# Patient Record
Sex: MALE | HISPANIC OR LATINO | URBAN - METROPOLITAN AREA
[De-identification: names, ages, dates, MRNs, and addresses within clinical notes are randomized per-mention and may not be internally consistent; named-entity substitution may affect disease eponyms.]

---

## 2021-04-08 ENCOUNTER — OFFICE VISIT (OUTPATIENT)
Dept: URBAN - METROPOLITAN AREA CLINIC 54 | Facility: CLINIC | Age: 36
End: 2021-04-08

## 2022-12-05 ENCOUNTER — OFFICE VISIT (OUTPATIENT)
Dept: URBAN - METROPOLITAN AREA CLINIC 54 | Facility: CLINIC | Age: 37
End: 2022-12-05

## 2023-02-13 ENCOUNTER — OFFICE VISIT (OUTPATIENT)
Dept: URBAN - METROPOLITAN AREA CLINIC 54 | Facility: CLINIC | Age: 38
End: 2023-02-13
Payer: COMMERCIAL

## 2023-02-13 VITALS
HEIGHT: 68 IN | WEIGHT: 234 LBS | TEMPERATURE: 98.2 F | BODY MASS INDEX: 35.46 KG/M2 | HEART RATE: 90 BPM | SYSTOLIC BLOOD PRESSURE: 124 MMHG | DIASTOLIC BLOOD PRESSURE: 84 MMHG

## 2023-02-13 DIAGNOSIS — K21.9 GERD WITHOUT ESOPHAGITIS: ICD-10-CM

## 2023-02-13 DIAGNOSIS — R14.0 BLOATING: ICD-10-CM

## 2023-02-13 PROCEDURE — 99204 OFFICE O/P NEW MOD 45 MIN: CPT | Performed by: INTERNAL MEDICINE

## 2023-02-13 RX ORDER — LORAZEPAM 0.5 MG/1
1 TABLET AT BEDTIME AS NEEDED TABLET ORAL ONCE A DAY
Status: ACTIVE | COMMUNITY

## 2023-02-13 NOTE — HPI-TODAY'S VISIT:
Patient reports that he has has stomach pain and bloating x 1 year.  Had upper endoscopy which thought to have an allergy. Had a lot of inflammation. Placed on gluten free diet and placed on dexilant. Pt was referred for biliary dyskinesia and then he felt slightly better the first week. Had lost 10-15#.  Pt reports that he regained the weight.  Pt reports that the symptoms did not stop.  Pt reports that the dexilant was not working any further. Pt was found to have delayed gastric emptying as well.  Pt reports that he was advised to continue with the medications.  Pt reports that he was recommended to take the omeprazole and wean off the dexilant.  Pt reports that the bloating bothers him the most. The reflux is persistent that radiates to the back.  Pt reports that he has pain to neck and has a panic attack.

## 2023-02-15 ENCOUNTER — OFFICE VISIT (OUTPATIENT)
Dept: URBAN - METROPOLITAN AREA SURGERY CENTER 14 | Facility: SURGERY CENTER | Age: 38
End: 2023-02-15

## 2023-02-16 PROBLEM — 266435005: Status: ACTIVE | Noted: 2023-02-13

## 2023-02-24 ENCOUNTER — OFFICE VISIT (OUTPATIENT)
Dept: URBAN - METROPOLITAN AREA SURGERY CENTER 14 | Facility: SURGERY CENTER | Age: 38
End: 2023-02-24

## 2023-02-28 ENCOUNTER — CLAIMS CREATED FROM THE CLAIM WINDOW (OUTPATIENT)
Dept: URBAN - METROPOLITAN AREA CLINIC 4 | Facility: CLINIC | Age: 38
End: 2023-02-28
Payer: COMMERCIAL

## 2023-02-28 ENCOUNTER — OFFICE VISIT (OUTPATIENT)
Dept: URBAN - METROPOLITAN AREA SURGERY CENTER 14 | Facility: SURGERY CENTER | Age: 38
End: 2023-02-28
Payer: COMMERCIAL

## 2023-02-28 DIAGNOSIS — K31.89 ACQUIRED DEFORMITY OF DUODENUM: ICD-10-CM

## 2023-02-28 DIAGNOSIS — R10.13 ABDOMINAL DISCOMFORT, EPIGASTRIC: ICD-10-CM

## 2023-02-28 DIAGNOSIS — K31.89 OTHER DISEASES OF STOMACH AND DUODENUM: ICD-10-CM

## 2023-02-28 DIAGNOSIS — K31.7 POLYP OF STOMACH AND DUODENUM: ICD-10-CM

## 2023-02-28 DIAGNOSIS — K31.7 BENIGN GASTRIC POLYP: ICD-10-CM

## 2023-02-28 DIAGNOSIS — K21.9 ACID REFLUX: ICD-10-CM

## 2023-02-28 PROCEDURE — 88312 SPECIAL STAINS GROUP 1: CPT | Performed by: PATHOLOGY

## 2023-02-28 PROCEDURE — 88305 TISSUE EXAM BY PATHOLOGIST: CPT | Performed by: PATHOLOGY

## 2023-02-28 PROCEDURE — G8907 PT DOC NO EVENTS ON DISCHARG: HCPCS | Performed by: INTERNAL MEDICINE

## 2023-02-28 PROCEDURE — 43239 EGD BIOPSY SINGLE/MULTIPLE: CPT | Performed by: INTERNAL MEDICINE

## 2023-03-01 ENCOUNTER — TELEPHONE ENCOUNTER (OUTPATIENT)
Dept: URBAN - METROPOLITAN AREA CLINIC 54 | Facility: CLINIC | Age: 38
End: 2023-03-01

## 2023-03-01 RX ORDER — HYOSCYAMINE SULFATE 0.12 MG/1
1 TABLET UNDER THE TONGUE AND ALLOW TO DISSOLVE  AS NEEDED TABLET SUBLINGUAL THREE TIMES A DAY
Qty: 60 EACH | Refills: 1 | OUTPATIENT

## 2023-03-03 ENCOUNTER — TELEPHONE ENCOUNTER (OUTPATIENT)
Dept: URBAN - METROPOLITAN AREA CLINIC 54 | Facility: CLINIC | Age: 38
End: 2023-03-03

## 2023-03-06 ENCOUNTER — TELEPHONE ENCOUNTER (OUTPATIENT)
Dept: URBAN - NONMETROPOLITAN AREA CLINIC 4 | Facility: CLINIC | Age: 38
End: 2023-03-06

## 2023-03-06 RX ORDER — PANCRELIPASE 36000; 180000; 114000 [USP'U]/1; [USP'U]/1; [USP'U]/1
AS DIRECTED CAPSULE, DELAYED RELEASE PELLETS ORAL
Qty: 250 | Refills: 3 | OUTPATIENT

## 2023-03-27 ENCOUNTER — TELEPHONE ENCOUNTER (OUTPATIENT)
Dept: URBAN - METROPOLITAN AREA CLINIC 54 | Facility: CLINIC | Age: 38
End: 2023-03-27

## 2023-03-28 ENCOUNTER — TELEPHONE ENCOUNTER (OUTPATIENT)
Dept: URBAN - METROPOLITAN AREA CLINIC 54 | Facility: CLINIC | Age: 38
End: 2023-03-28

## 2023-03-28 RX ORDER — AMITRIPTYLINE HYDROCHLORIDE 10 MG/1
1 TABLET AT BEDTIME TABLET, FILM COATED ORAL ONCE A DAY
Qty: 30 | Refills: 1 | OUTPATIENT
Start: 2023-04-06

## 2023-05-25 ENCOUNTER — TELEPHONE ENCOUNTER (OUTPATIENT)
Dept: URBAN - METROPOLITAN AREA CLINIC 54 | Facility: CLINIC | Age: 38
End: 2023-05-25

## 2023-05-30 ENCOUNTER — LAB OUTSIDE AN ENCOUNTER (OUTPATIENT)
Dept: URBAN - METROPOLITAN AREA CLINIC 54 | Facility: CLINIC | Age: 38
End: 2023-05-30

## 2023-06-05 ENCOUNTER — CLAIMS CREATED FROM THE CLAIM WINDOW (OUTPATIENT)
Dept: URBAN - NONMETROPOLITAN AREA CLINIC 4 | Facility: CLINIC | Age: 38
End: 2023-06-05

## 2023-06-05 ENCOUNTER — OFFICE VISIT (OUTPATIENT)
Dept: URBAN - NONMETROPOLITAN AREA CLINIC 4 | Facility: CLINIC | Age: 38
End: 2023-06-05

## 2023-06-05 ENCOUNTER — CLAIMS CREATED FROM THE CLAIM WINDOW (OUTPATIENT)
Dept: URBAN - NONMETROPOLITAN AREA CLINIC 4 | Facility: CLINIC | Age: 38
End: 2023-06-05
Payer: COMMERCIAL

## 2023-06-05 ENCOUNTER — WEB ENCOUNTER (OUTPATIENT)
Dept: URBAN - NONMETROPOLITAN AREA CLINIC 4 | Facility: CLINIC | Age: 38
End: 2023-06-05

## 2023-06-05 VITALS
BODY MASS INDEX: 35.49 KG/M2 | HEIGHT: 68 IN | SYSTOLIC BLOOD PRESSURE: 122 MMHG | TEMPERATURE: 97.3 F | HEART RATE: 76 BPM | DIASTOLIC BLOOD PRESSURE: 80 MMHG | WEIGHT: 234.2 LBS

## 2023-06-05 DIAGNOSIS — R14.0 BLOATING: ICD-10-CM

## 2023-06-05 DIAGNOSIS — K21.9 GERD WITHOUT ESOPHAGITIS: ICD-10-CM

## 2023-06-05 PROCEDURE — 99214 OFFICE O/P EST MOD 30 MIN: CPT | Performed by: INTERNAL MEDICINE

## 2023-06-05 RX ORDER — IBUPROFEN 800 MG/1
1 TABLET WITH FOOD OR MILK AS NEEDED TABLET, FILM COATED ORAL
Status: ON HOLD | COMMUNITY

## 2023-06-05 RX ORDER — HYOSCYAMINE SULFATE 0.12 MG/1
1 TABLET UNDER THE TONGUE AND ALLOW TO DISSOLVE  AS NEEDED TABLET SUBLINGUAL THREE TIMES A DAY
Qty: 60 EACH | Refills: 1 | Status: ACTIVE | COMMUNITY

## 2023-06-05 RX ORDER — LORAZEPAM 0.5 MG/1
1 TABLET AT BEDTIME AS NEEDED TABLET ORAL ONCE A DAY
Status: ACTIVE | COMMUNITY

## 2023-06-05 RX ORDER — AMITRIPTYLINE HYDROCHLORIDE 10 MG/1
1 TABLET AT BEDTIME TABLET, FILM COATED ORAL ONCE A DAY
Qty: 30 | Refills: 1 | Status: ACTIVE | COMMUNITY
Start: 2023-04-06

## 2023-06-05 RX ORDER — PANCRELIPASE 36000; 180000; 114000 [USP'U]/1; [USP'U]/1; [USP'U]/1
AS DIRECTED CAPSULE, DELAYED RELEASE PELLETS ORAL
Qty: 250 | Refills: 3 | Status: DISCONTINUED | COMMUNITY

## 2023-06-05 NOTE — HPI-TODAY'S VISIT:
Patient reports that he has has stomach pain and bloating x 1 year.  Had upper endoscopy which thought to have an allergy. Had a lot of inflammation. Placed on gluten free diet and placed on dexilant. Pt was referred for biliary dyskinesia and then he felt slightly better the first week. Had lost 10-15#.  Pt reports that he regained the weight.  Pt reports that the symptoms did not stop.  Pt reports that the dexilant was not working any further. Pt was found to have delayed gastric emptying as well.  Pt reports that he was advised to continue with the medications.  Pt reports that he was recommended to take the omeprazole and wean off the dexilant.  Pt reports that the bloating bothers him the most. The reflux is persistent that radiates to the back.  Pt reports that he has pain to neck and has a panic attack.  6-5-23 Pt reports that he had an ER visit in March along with CT which was negative.  Pt reports that symptoms are worse with eating.  Pt reports that he was advised to lose weight and had motiligy studies and gerd.  Pt reports that he was advised to see weight loss surgery.  Pt reports that every day he feels like bloated.  Pt reports that he feels something in the left upper and throught back.  Pt reports that he feels something still. Pt reports that he has not tried motility.   Of note review of records from 2022 with  normal gastric emptying Still w distension and bloating creon

## 2023-06-08 ENCOUNTER — OFFICE VISIT (OUTPATIENT)
Dept: URBAN - METROPOLITAN AREA TELEHEALTH 2 | Facility: TELEHEALTH | Age: 38
End: 2023-06-08
Payer: COMMERCIAL

## 2023-06-08 DIAGNOSIS — K21.9 GERD WITHOUT ESOPHAGITIS: ICD-10-CM

## 2023-06-08 DIAGNOSIS — K58.8 OTHER IRRITABLE BOWEL SYNDROME: ICD-10-CM

## 2023-06-08 PROCEDURE — 97802 MEDICAL NUTRITION INDIV IN: CPT | Performed by: DIETITIAN, REGISTERED

## 2023-06-08 RX ORDER — LORAZEPAM 0.5 MG/1
1 TABLET AT BEDTIME AS NEEDED TABLET ORAL ONCE A DAY
Status: ACTIVE | COMMUNITY

## 2023-06-08 RX ORDER — IBUPROFEN 800 MG/1
1 TABLET WITH FOOD OR MILK AS NEEDED TABLET, FILM COATED ORAL
Status: ON HOLD | COMMUNITY

## 2023-06-08 RX ORDER — AMITRIPTYLINE HYDROCHLORIDE 10 MG/1
1 TABLET AT BEDTIME TABLET, FILM COATED ORAL ONCE A DAY
Qty: 30 | Refills: 1 | Status: ACTIVE | COMMUNITY
Start: 2023-04-06

## 2023-06-08 RX ORDER — HYOSCYAMINE SULFATE 0.12 MG/1
1 TABLET UNDER THE TONGUE AND ALLOW TO DISSOLVE  AS NEEDED TABLET SUBLINGUAL THREE TIMES A DAY
Qty: 60 EACH | Refills: 1 | Status: ACTIVE | COMMUNITY

## 2023-06-09 ENCOUNTER — OFFICE VISIT (OUTPATIENT)
Dept: URBAN - METROPOLITAN AREA TELEHEALTH 2 | Facility: TELEHEALTH | Age: 38
End: 2023-06-09

## 2023-06-09 RX ORDER — IBUPROFEN 800 MG/1
1 TABLET WITH FOOD OR MILK AS NEEDED TABLET, FILM COATED ORAL
Status: ON HOLD | COMMUNITY

## 2023-06-09 RX ORDER — AMITRIPTYLINE HYDROCHLORIDE 10 MG/1
1 TABLET AT BEDTIME TABLET, FILM COATED ORAL ONCE A DAY
Qty: 30 | Refills: 1 | Status: ACTIVE | COMMUNITY
Start: 2023-04-06

## 2023-06-09 RX ORDER — LORAZEPAM 0.5 MG/1
1 TABLET AT BEDTIME AS NEEDED TABLET ORAL ONCE A DAY
Status: ACTIVE | COMMUNITY

## 2023-06-09 RX ORDER — HYOSCYAMINE SULFATE 0.12 MG/1
1 TABLET UNDER THE TONGUE AND ALLOW TO DISSOLVE  AS NEEDED TABLET SUBLINGUAL THREE TIMES A DAY
Qty: 60 EACH | Refills: 1 | Status: ACTIVE | COMMUNITY

## 2023-06-11 ENCOUNTER — WEB ENCOUNTER (OUTPATIENT)
Dept: URBAN - METROPOLITAN AREA CLINIC 54 | Facility: CLINIC | Age: 38
End: 2023-06-11

## 2023-07-18 ENCOUNTER — OFFICE VISIT (OUTPATIENT)
Dept: URBAN - METROPOLITAN AREA TELEHEALTH 2 | Facility: TELEHEALTH | Age: 38
End: 2023-07-18

## 2023-07-31 ENCOUNTER — TELEPHONE ENCOUNTER (OUTPATIENT)
Dept: URBAN - NONMETROPOLITAN AREA CLINIC 4 | Facility: CLINIC | Age: 38
End: 2023-07-31

## 2023-08-14 ENCOUNTER — TELEPHONE ENCOUNTER (OUTPATIENT)
Dept: URBAN - NONMETROPOLITAN AREA CLINIC 4 | Facility: CLINIC | Age: 38
End: 2023-08-14

## 2023-08-16 ENCOUNTER — WEB ENCOUNTER (OUTPATIENT)
Dept: URBAN - METROPOLITAN AREA CLINIC 54 | Facility: CLINIC | Age: 38
End: 2023-08-16

## 2023-08-16 RX ORDER — TENAPANOR HYDROCHLORIDE 53.2 MG/1
1 TABLET IMMEDIATELY BEFORE MEALS TABLET ORAL TWICE A DAY
Qty: 60 | OUTPATIENT
Start: 2023-08-18 | End: 2023-09-17

## 2023-08-18 ENCOUNTER — TELEPHONE ENCOUNTER (OUTPATIENT)
Dept: URBAN - METROPOLITAN AREA CLINIC 54 | Facility: CLINIC | Age: 38
End: 2023-08-18

## 2023-08-18 RX ORDER — TENAPANOR HYDROCHLORIDE 53.2 MG/1
1 TABLET IMMEDIATELY BEFORE MEALS TABLET ORAL TWICE A DAY
Qty: 60 | OUTPATIENT
Start: 2023-08-18 | End: 2023-09-17

## 2023-08-21 ENCOUNTER — TELEPHONE ENCOUNTER (OUTPATIENT)
Dept: URBAN - METROPOLITAN AREA CLINIC 54 | Facility: CLINIC | Age: 38
End: 2023-08-21

## 2023-08-22 ENCOUNTER — P2P PATIENT RECORD (OUTPATIENT)
Age: 38
End: 2023-08-22

## 2023-08-25 ENCOUNTER — TELEPHONE ENCOUNTER (OUTPATIENT)
Dept: URBAN - METROPOLITAN AREA CLINIC 54 | Facility: CLINIC | Age: 38
End: 2023-08-25

## 2023-08-29 ENCOUNTER — OFFICE VISIT (OUTPATIENT)
Dept: URBAN - METROPOLITAN AREA CLINIC 54 | Facility: CLINIC | Age: 38
End: 2023-08-29
Payer: COMMERCIAL

## 2023-08-29 ENCOUNTER — WEB ENCOUNTER (OUTPATIENT)
Dept: URBAN - METROPOLITAN AREA CLINIC 54 | Facility: CLINIC | Age: 38
End: 2023-08-29

## 2023-08-29 VITALS
TEMPERATURE: 97.6 F | WEIGHT: 232.2 LBS | HEIGHT: 68 IN | BODY MASS INDEX: 35.19 KG/M2 | SYSTOLIC BLOOD PRESSURE: 107 MMHG | DIASTOLIC BLOOD PRESSURE: 67 MMHG | HEART RATE: 72 BPM

## 2023-08-29 DIAGNOSIS — K21.9 GERD WITHOUT ESOPHAGITIS: ICD-10-CM

## 2023-08-29 DIAGNOSIS — R14.0 BLOATING: ICD-10-CM

## 2023-08-29 PROCEDURE — 99215 OFFICE O/P EST HI 40 MIN: CPT | Performed by: PHYSICIAN ASSISTANT

## 2023-08-29 RX ORDER — TENAPANOR HYDROCHLORIDE 53.2 MG/1
1 TABLET IMMEDIATELY BEFORE MEALS TABLET ORAL TWICE A DAY
Qty: 60 | Status: ACTIVE | COMMUNITY
Start: 2023-08-18 | End: 2023-09-17

## 2023-08-29 RX ORDER — DEXLANSOPRAZOLE 30 MG/1
1 CAPSULE CAPSULE, DELAYED RELEASE ORAL ONCE A DAY
Status: ACTIVE | COMMUNITY

## 2023-08-29 RX ORDER — IBUPROFEN 800 MG/1
1 TABLET WITH FOOD OR MILK AS NEEDED TABLET, FILM COATED ORAL
Status: ON HOLD | COMMUNITY

## 2023-08-29 RX ORDER — LORAZEPAM 0.5 MG/1
1 TABLET AT BEDTIME AS NEEDED TABLET ORAL ONCE A DAY
Status: ACTIVE | COMMUNITY

## 2023-08-29 RX ORDER — AMITRIPTYLINE HYDROCHLORIDE 10 MG/1
1 TABLET AT BEDTIME TABLET, FILM COATED ORAL ONCE A DAY
Qty: 30 | Refills: 1 | Status: ON HOLD | COMMUNITY
Start: 2023-04-06

## 2023-08-29 RX ORDER — LOSARTAN POTASSIUM 50 MG/1
1 TABLET TABLET ORAL ONCE A DAY
Status: ACTIVE | COMMUNITY

## 2023-08-29 NOTE — HPI-TODAY'S VISIT:
Patient reports that he has has stomach pain and bloating x 1 year.  Had upper endoscopy which thought to have an allergy. Had a lot of inflammation. Placed on gluten free diet and placed on dexilant. Pt was referred for biliary dyskinesia and then he felt slightly better the first week. Had lost 10-15#.  Pt reports that he regained the weight.  Pt reports that the symptoms did not stop.  Pt reports that the dexilant was not working any further. Pt was found to have delayed gastric emptying as well.  Pt reports that he was advised to continue with the medications.  Pt reports that he was recommended to take the omeprazole and wean off the dexilant.  Pt reports that the bloating bothers him the most. The reflux is persistent that radiates to the back.  Pt reports that he has pain to neck and has a panic attack.  6-5-23 Pt reports that he had an ER visit in March along with CT which was negative.  Pt reports that symptoms are worse with eating.  Pt reports that he was advised to lose weight and had motiligy studies and gerd.  Pt reports that he was advised to see weight loss surgery.  Pt reports that every day he feels like bloated.  Pt reports that he feels something in the left upper and throught back.  Pt reports that he feels something still. Pt reports that he has not tried motility.   Of note review of records from 2022 with  normal gastric emptying Still w distension and bloating creon  8/29/23 Follow up: Normal previous GES. Missed appointment for repeat GES. Negative SIBO. Incomplete lactulose testing. Patient trialed Ibsrela with improvement in symptoms. Denied coverage by insurance. Specialty pharmacy plans to reach out to patient about PAP. Recently diagnosed with hypertriglyceridemia and started on fenofibrate. Denies EtOH and tobacco use. On Creon. Still with bloating, pain, and constipation.

## 2023-08-30 ENCOUNTER — OFFICE VISIT (OUTPATIENT)
Dept: URBAN - NONMETROPOLITAN AREA CLINIC 4 | Facility: CLINIC | Age: 38
End: 2023-08-30

## 2023-09-25 ENCOUNTER — OFFICE VISIT (OUTPATIENT)
Dept: URBAN - NONMETROPOLITAN AREA CLINIC 4 | Facility: CLINIC | Age: 38
End: 2023-09-25
Payer: COMMERCIAL

## 2023-09-25 VITALS
WEIGHT: 231.4 LBS | BODY MASS INDEX: 35.07 KG/M2 | HEIGHT: 68 IN | SYSTOLIC BLOOD PRESSURE: 119 MMHG | HEART RATE: 87 BPM | TEMPERATURE: 97.5 F | DIASTOLIC BLOOD PRESSURE: 87 MMHG

## 2023-09-25 DIAGNOSIS — K58.8 OTHER IRRITABLE BOWEL SYNDROME: ICD-10-CM

## 2023-09-25 DIAGNOSIS — K62.5 RECTAL BLEEDING: ICD-10-CM

## 2023-09-25 DIAGNOSIS — K21.9 GERD WITHOUT ESOPHAGITIS: ICD-10-CM

## 2023-09-25 PROCEDURE — 99214 OFFICE O/P EST MOD 30 MIN: CPT | Performed by: INTERNAL MEDICINE

## 2023-09-25 RX ORDER — LORAZEPAM 0.5 MG/1
1 TABLET AT BEDTIME AS NEEDED TABLET ORAL ONCE A DAY
Status: ACTIVE | COMMUNITY

## 2023-09-25 RX ORDER — LOSARTAN POTASSIUM 50 MG/1
1 TABLET TABLET ORAL ONCE A DAY
Status: ACTIVE | COMMUNITY

## 2023-09-25 RX ORDER — IBUPROFEN 800 MG/1
1 TABLET WITH FOOD OR MILK AS NEEDED TABLET, FILM COATED ORAL
Status: ON HOLD | COMMUNITY

## 2023-09-25 RX ORDER — LINACLOTIDE 145 UG/1
1 CAPSULE AT LEAST 30 MINUTES BEFORE THE FIRST MEAL OF THE DAY ON AN EMPTY STOMACH CAPSULE, GELATIN COATED ORAL ONCE A DAY
Qty: 30 | Refills: 11 | OUTPATIENT
Start: 2023-09-25 | End: 2023-10-25

## 2023-09-25 RX ORDER — AMITRIPTYLINE HYDROCHLORIDE 10 MG/1
1 TABLET AT BEDTIME TABLET, FILM COATED ORAL ONCE A DAY
Qty: 30 | Refills: 1 | Status: ON HOLD | COMMUNITY
Start: 2023-04-06

## 2023-09-25 RX ORDER — DEXLANSOPRAZOLE 30 MG/1
1 CAPSULE CAPSULE, DELAYED RELEASE ORAL ONCE A DAY
Status: ACTIVE | COMMUNITY

## 2023-09-25 NOTE — HPI-TODAY'S VISIT:
Patient reports that he has has stomach pain and bloating x 1 year.  Had upper endoscopy which thought to have an allergy. Had a lot of inflammation. Placed on gluten free diet and placed on dexilant. Pt was referred for biliary dyskinesia and then he felt slightly better the first week. Had lost 10-15#.  Pt reports that he regained the weight.  Pt reports that the symptoms did not stop.  Pt reports that the dexilant was not working any further. Pt was found to have delayed gastric emptying as well.  Pt reports that he was advised to continue with the medications.  Pt reports that he was recommended to take the omeprazole and wean off the dexilant.  Pt reports that the bloating bothers him the most. The reflux is persistent that radiates to the back.  Pt reports that he has pain to neck and has a panic attack.  6-5-23 Pt reports that he had an ER visit in March along with CT which was negative.  Pt reports that symptoms are worse with eating.  Pt reports that he was advised to lose weight and had motiligy studies and gerd.  Pt reports that he was advised to see weight loss surgery.  Pt reports that every day he feels like bloated.  Pt reports that he feels something in the left upper and throught back.  Pt reports that he feels something still. Pt reports that he has not tried motility.   Of note review of records from 2022 with  normal gastric emptying Still w distension and bloating creon  8/29/23 Follow up: Normal previous GES. Missed appointment for repeat GES. Negative SIBO. Incomplete lactulose testing. Patient trialed Ibsrela with improvement in symptoms. Denied coverage by insurance. Specialty pharmacy plans to reach out to patient about PAP. Recently diagnosed with hypertriglyceridemia and started on fenofibrate. Denies EtOH and tobacco use. On Creon. Still with bloating, pain, and constipation. 9-25-23 Pt reports that he was tried on ibresla. Insurance did not help. has ongoing gerd.  Pt reports that he has mucous in stools.  Pt reports that he saw some "material" in his stool and has x ray with chiropracter which demonstrated abnormal bowel gas pattern with thumbprinting. Pt from  and concerned about "parasitic" infection. Noted 2  episodes of rectal bleeding.  Thinks possibly hemorrhoids.

## 2023-09-26 LAB
(TTG) AB, IGA: <1
(TTG) AB, IGG: <1
ANTIGLIADIN ABS, IGA: <1
GLIADIN (DEAMIDATED) AB (IGA): <1
GLIADIN (DEAMIDATED) AB (IGG): <1
IMMUNOGLOBULIN A: 228
T-TRANSGLUTAMINASE (TTG) IGA: <1

## 2023-09-30 LAB
CAMPYLOBACTER GROUP: NOT DETECTED
NOROVIRUS GI/GII: NOT DETECTED
OCCULT BLOOD, FECAL, IA: (no result)
ROTAVIRUS A: NOT DETECTED
SALMONELLA SPECIES: NOT DETECTED
SHIGA TOXIN 1: NOT DETECTED
SHIGA TOXIN 2: NOT DETECTED
SHIGELLA SPECIES: NOT DETECTED
VIBRIO GROUP: NOT DETECTED
YERSINIA ENTEROCOLITICA: NOT DETECTED

## 2023-10-12 ENCOUNTER — TELEPHONE ENCOUNTER (OUTPATIENT)
Dept: URBAN - METROPOLITAN AREA CLINIC 6 | Facility: CLINIC | Age: 38
End: 2023-10-12

## 2023-10-17 LAB — TRICHROME (1): (no result)

## 2023-10-31 ENCOUNTER — TELEPHONE ENCOUNTER (OUTPATIENT)
Dept: URBAN - METROPOLITAN AREA CLINIC 54 | Facility: CLINIC | Age: 38
End: 2023-10-31

## 2023-10-31 RX ORDER — METRONIDAZOLE 250 MG/1
1 TABLET TABLET ORAL THREE TIMES A DAY
Qty: 30 | OUTPATIENT
Start: 2023-10-31 | End: 2023-11-09

## 2023-11-30 ENCOUNTER — OFFICE VISIT (OUTPATIENT)
Dept: URBAN - NONMETROPOLITAN AREA CLINIC 4 | Facility: CLINIC | Age: 38
End: 2023-11-30

## 2024-03-18 ENCOUNTER — OV EP (OUTPATIENT)
Dept: URBAN - NONMETROPOLITAN AREA CLINIC 4 | Facility: CLINIC | Age: 39
End: 2024-03-18
Payer: COMMERCIAL

## 2024-03-18 VITALS
BODY MASS INDEX: 36.53 KG/M2 | HEIGHT: 68 IN | HEART RATE: 67 BPM | WEIGHT: 241 LBS | DIASTOLIC BLOOD PRESSURE: 107 MMHG | SYSTOLIC BLOOD PRESSURE: 165 MMHG | TEMPERATURE: 98.3 F

## 2024-03-18 DIAGNOSIS — K59.09 CHRONIC CONSTIPATION: ICD-10-CM

## 2024-03-18 DIAGNOSIS — K21.9 GERD WITHOUT ESOPHAGITIS: ICD-10-CM

## 2024-03-18 DIAGNOSIS — K58.8 OTHER IRRITABLE BOWEL SYNDROME: ICD-10-CM

## 2024-03-18 PROCEDURE — 99214 OFFICE O/P EST MOD 30 MIN: CPT | Performed by: PHYSICIAN ASSISTANT

## 2024-03-18 RX ORDER — DEXLANSOPRAZOLE 30 MG/1
1 CAPSULE CAPSULE, DELAYED RELEASE ORAL ONCE A DAY
Qty: 30 | Refills: 11

## 2024-03-18 RX ORDER — AMITRIPTYLINE HYDROCHLORIDE 10 MG/1
1 TABLET AT BEDTIME TABLET, FILM COATED ORAL ONCE A DAY
Qty: 30 | Refills: 1 | Status: ON HOLD | COMMUNITY
Start: 2023-04-06

## 2024-03-18 RX ORDER — LOSARTAN POTASSIUM 50 MG/1
1 TABLET TABLET ORAL ONCE A DAY
Status: ACTIVE | COMMUNITY

## 2024-03-18 RX ORDER — LORAZEPAM 0.5 MG/1
1 TABLET AT BEDTIME AS NEEDED TABLET ORAL ONCE A DAY
Status: ACTIVE | COMMUNITY

## 2024-03-18 RX ORDER — DEXLANSOPRAZOLE 30 MG/1
1 CAPSULE CAPSULE, DELAYED RELEASE ORAL ONCE A DAY
Status: ACTIVE | COMMUNITY

## 2024-03-18 RX ORDER — IBUPROFEN 800 MG/1
1 TABLET WITH FOOD OR MILK AS NEEDED TABLET, FILM COATED ORAL
Status: ON HOLD | COMMUNITY

## 2024-03-18 NOTE — HPI-TODAY'S VISIT:
Patient reports that he has has stomach pain and bloating x 1 year.  Had upper endoscopy which thought to have an allergy. Had a lot of inflammation. Placed on gluten free diet and placed on dexilant. Pt was referred for biliary dyskinesia and then he felt slightly better the first week. Had lost 10-15#.  Pt reports that he regained the weight.  Pt reports that the symptoms did not stop.  Pt reports that the dexilant was not working any further. Pt was found to have delayed gastric emptying as well.  Pt reports that he was advised to continue with the medications.  Pt reports that he was recommended to take the omeprazole and wean off the dexilant.  Pt reports that the bloating bothers him the most. The reflux is persistent that radiates to the back.  Pt reports that he has pain to neck and has a panic attack.  6-5-23 Pt reports that he had an ER visit in March along with CT which was negative.  Pt reports that symptoms are worse with eating.  Pt reports that he was advised to lose weight and had motiligy studies and gerd.  Pt reports that he was advised to see weight loss surgery.  Pt reports that every day he feels like bloated.  Pt reports that he feels something in the left upper and throught back.  Pt reports that he feels something still. Pt reports that he has not tried motility.   Of note review of records from 2022 with  normal gastric emptying Still w distension and bloating creon  8/29/23 Follow up: Normal previous GES. Missed appointment for repeat GES. Negative SIBO. Incomplete lactulose testing. Patient trialed Ibsrela with improvement in symptoms. Denied coverage by insurance. Specialty pharmacy plans to reach out to patient about PAP. Recently diagnosed with hypertriglyceridemia and started on fenofibrate. Denies EtOH and tobacco use. On Creon. Still with bloating, pain, and constipation. 9-25-23 Pt reports that he was tried on ibresla. Insurance did not help. has ongoing gerd.  Pt reports that he has mucous in stools.  Pt reports that he saw some "material" in his stool and has x ray with chiropracter which demonstrated abnormal bowel gas pattern with thumbprinting. Pt from  and concerned about "parasitic" infection. Noted 2  episodes of rectal bleeding.  Thinks possibly hemorrhoids.   3.18.24 EGD 2023: - Ulceration was found in the right pyriform sinus. - Abnormal esophageal motility. - A single gastric polyp. Resected and retrieved. - The examination was otherwise normal. - Biopsies were taken with a cold forceps for evaluation of eosinophilic esophagitis. - Biopsies were taken with a cold forceps for Helicobacter pylori testing. - Biopsies were taken with a cold forceps for evaluation of celiac disease.  path neg for HP, only with mild reflux  here today, needs refills for PPI which he states helps his pain, but alsocomplains of abd pain with constipation and does not have anything to trial.  BMs 2x per week.  States his overall abd pain is better since he is on medicine for anxiety

## 2024-04-15 ENCOUNTER — OV EP (OUTPATIENT)
Dept: URBAN - NONMETROPOLITAN AREA CLINIC 4 | Facility: CLINIC | Age: 39
End: 2024-04-15

## 2024-04-15 RX ORDER — LORAZEPAM 0.5 MG/1
1 TABLET AT BEDTIME AS NEEDED TABLET ORAL ONCE A DAY
COMMUNITY

## 2024-04-15 RX ORDER — DEXLANSOPRAZOLE 30 MG/1
1 CAPSULE CAPSULE, DELAYED RELEASE ORAL ONCE A DAY
Qty: 30 | Refills: 11 | COMMUNITY

## 2024-04-15 RX ORDER — LOSARTAN POTASSIUM 50 MG/1
1 TABLET TABLET ORAL ONCE A DAY
COMMUNITY

## 2024-05-17 ENCOUNTER — TELEPHONE ENCOUNTER (OUTPATIENT)
Dept: URBAN - NONMETROPOLITAN AREA CLINIC 4 | Facility: CLINIC | Age: 39
End: 2024-05-17

## 2024-05-17 RX ORDER — DEXLANSOPRAZOLE 30 MG/1
1 CAPSULE CAPSULE, DELAYED RELEASE ORAL ONCE A DAY
Qty: 30 | Refills: 11

## 2024-05-24 ENCOUNTER — OFFICE VISIT (OUTPATIENT)
Dept: URBAN - METROPOLITAN AREA CLINIC 54 | Facility: CLINIC | Age: 39
End: 2024-05-24
Payer: COMMERCIAL

## 2024-05-24 ENCOUNTER — DASHBOARD ENCOUNTERS (OUTPATIENT)
Age: 39
End: 2024-05-24

## 2024-05-24 VITALS
TEMPERATURE: 98.1 F | SYSTOLIC BLOOD PRESSURE: 122 MMHG | DIASTOLIC BLOOD PRESSURE: 76 MMHG | HEIGHT: 68 IN | HEART RATE: 70 BPM | BODY MASS INDEX: 36.53 KG/M2 | WEIGHT: 241 LBS

## 2024-05-24 DIAGNOSIS — K59.09 CHRONIC CONSTIPATION: ICD-10-CM

## 2024-05-24 DIAGNOSIS — R10.13 DYSPEPSIA: ICD-10-CM

## 2024-05-24 DIAGNOSIS — K58.8 OTHER IRRITABLE BOWEL SYNDROME: ICD-10-CM

## 2024-05-24 DIAGNOSIS — K21.9 GERD WITHOUT ESOPHAGITIS: ICD-10-CM

## 2024-05-24 PROCEDURE — 99214 OFFICE O/P EST MOD 30 MIN: CPT

## 2024-05-24 RX ORDER — TENAPANOR HYDROCHLORIDE 53.2 MG/1
1 TABLET IMMEDIATELY BEFORE MEALS TABLET ORAL TWICE A DAY
Status: ACTIVE | COMMUNITY

## 2024-05-24 RX ORDER — ALPRAZOLAM 0.5 MG/1
1 TABLET TABLET ORAL AT BEDTIME
Status: ACTIVE | COMMUNITY

## 2024-05-24 RX ORDER — LOSARTAN POTASSIUM 50 MG/1
1 TABLET TABLET ORAL ONCE A DAY
Status: ACTIVE | COMMUNITY

## 2024-05-24 RX ORDER — OMEPRAZOLE 20 MG/1
1 CAPSULE 30 MINUTES BEFORE MEAL CAPSULE, DELAYED RELEASE ORAL TWICE A DAY
Qty: 60 | Refills: 5

## 2024-05-24 RX ORDER — DEXLANSOPRAZOLE 30 MG/1
1 CAPSULE CAPSULE, DELAYED RELEASE ORAL ONCE A DAY
Qty: 30 | Refills: 11 | Status: ON HOLD | COMMUNITY

## 2024-05-24 RX ORDER — OMEPRAZOLE 20 MG/1
1 CAPSULE 30 MINUTES BEFORE MORNING MEAL CAPSULE, DELAYED RELEASE ORAL TWICE A DAY
Status: ACTIVE | COMMUNITY

## 2024-07-22 ENCOUNTER — OFFICE VISIT (OUTPATIENT)
Dept: URBAN - NONMETROPOLITAN AREA CLINIC 4 | Facility: CLINIC | Age: 39
End: 2024-07-22
Payer: COMMERCIAL

## 2024-07-22 ENCOUNTER — LAB OUTSIDE AN ENCOUNTER (OUTPATIENT)
Dept: URBAN - NONMETROPOLITAN AREA CLINIC 4 | Facility: CLINIC | Age: 39
End: 2024-07-22

## 2024-07-22 VITALS
DIASTOLIC BLOOD PRESSURE: 84 MMHG | WEIGHT: 242.2 LBS | HEART RATE: 78 BPM | SYSTOLIC BLOOD PRESSURE: 121 MMHG | HEIGHT: 68 IN | BODY MASS INDEX: 36.71 KG/M2 | TEMPERATURE: 98.2 F

## 2024-07-22 DIAGNOSIS — R07.81 PLEURITIC PAIN: ICD-10-CM

## 2024-07-22 DIAGNOSIS — B89 PARASITE INFECTION: ICD-10-CM

## 2024-07-22 DIAGNOSIS — B36.9 FUNGAL RASH OF TORSO: ICD-10-CM

## 2024-07-22 DIAGNOSIS — K21.9 GERD WITHOUT ESOPHAGITIS: ICD-10-CM

## 2024-07-22 DIAGNOSIS — K59.09 CHRONIC CONSTIPATION: ICD-10-CM

## 2024-07-22 PROCEDURE — 99214 OFFICE O/P EST MOD 30 MIN: CPT | Performed by: INTERNAL MEDICINE

## 2024-07-22 RX ORDER — ALPRAZOLAM 0.5 MG/1
1 TABLET TABLET ORAL AT BEDTIME
Status: ACTIVE | COMMUNITY

## 2024-07-22 RX ORDER — NYSTATIN 100000 [USP'U]/G
1 APPLICATION POWDER TOPICAL TWICE A DAY
Qty: 1 KIT | Refills: 0 | OUTPATIENT
Start: 2024-07-22 | End: 2024-08-21

## 2024-07-22 RX ORDER — LOSARTAN POTASSIUM 50 MG/1
1 TABLET TABLET ORAL ONCE A DAY
Status: ACTIVE | COMMUNITY

## 2024-07-22 RX ORDER — DICYCLOMINE HYDROCHLORIDE 10 MG/1
2 CAPSULES CAPSULE ORAL THREE TIMES A DAY
Qty: 180 | OUTPATIENT
Start: 2024-07-22 | End: 2024-08-21

## 2024-07-22 RX ORDER — TENAPANOR HYDROCHLORIDE 53.2 MG/1
1 TABLET IMMEDIATELY BEFORE MEALS TABLET ORAL TWICE A DAY
Status: ACTIVE | COMMUNITY

## 2024-07-22 RX ORDER — OMEPRAZOLE 20 MG/1
1 CAPSULE 30 MINUTES BEFORE MEAL CAPSULE, DELAYED RELEASE ORAL TWICE A DAY
Qty: 60 | Refills: 5 | Status: ACTIVE | COMMUNITY

## 2024-07-22 NOTE — PHYSICAL EXAM SKIN:
Routing refill request to provider for review/approval because:  Labs not current:  [facility]  Esther Peters RN         under both breast has erythematous irregular rash

## 2024-07-22 NOTE — PHYSICAL EXAM CONSTITUTIONAL:
Problem List Items Addressed This Visit        Behavioral    SAVANAH (generalized anxiety disorder)     Doing better w/ lifestyle management  No meds needed              Digestive    Vitamin D deficiency     Recheck on 2000 units.             Musculoskeletal    Osteoporosis, senile     ...  DEXA due next year  daily wt bearing exercise encouraged   calcium in form of 3 dairy equivalents daily encouraged...if not able, then calcium 500-600 mg twice daily with food  Vitamin D level w/ labs  Medication:  Alendronate since 5/2018  Discussed at length              Endocrine    Pure hypercholesterolemia     Recheck on no meds.             Other    Wellness examination - Primary     Vaccines:  Flu shot in the fall, Shingrix is the new shingles vaccine; a two shot series 2-6 months apart.  Not covered by Medicare.  If you obtain the vaccine at a pharmacy, please call with dates of administration.   Labs:  Fasting labs ordered  Pap:  HPV/ pap done 2017  Mammogram:  Ordered w/ screening u/s for dense breasts  Colonoscopy:  Please set up/ you are due  DEXA:  Next year.   Daily aerobic exercise advised.  Three dairy equivalents daily recommended for bone health, if not possible, use calcium supplements to take in a total of 1200 mg calcium daily  Aspirin is not indicated             Relevant Orders    CBC & AUTO DIFFERENTIAL    COMPREHENSIVE METABOLIC PANEL    LIPID PANEL WITH REFLEX    THYROID STIMULATING HORMONE REFLEX    VITAMIN D -25 HYDROXY      Other Visit Diagnoses     Breast screening        Relevant Orders    MAMMO SCREENING BILATERAL W FRIDA    Dense breast        Relevant Orders    US BREAST SCREENING 4 QUADRANTS BILATERAL    Breast cancer screening other than mammogram        Relevant Orders    US BREAST SCREENING 4 QUADRANTS BILATERAL    Screening for colon cancer        Relevant Orders    SCREENING COLONOSCOPY           alert, in no acute distress, well developed, well nourished

## 2024-07-22 NOTE — HPI-TODAY'S VISIT:
Patient reports that he has has stomach pain and bloating x 1 year.  Had upper endoscopy which thought to have an allergy. Had a lot of inflammation. Placed on gluten free diet and placed on dexilant. Pt was referred for biliary dyskinesia and then he felt slightly better the first week. Had lost 10-15#.  Pt reports that he regained the weight.  Pt reports that the symptoms did not stop.  Pt reports that the dexilant was not working any further. Pt was found to have delayed gastric emptying as well.  Pt reports that he was advised to continue with the medications.  Pt reports that he was recommended to take the omeprazole and wean off the dexilant.  Pt reports that the bloating bothers him the most. The reflux is persistent that radiates to the back.  Pt reports that he has pain to neck and has a panic attack.  6-5-23 Pt reports that he had an ER visit in March along with CT which was negative.  Pt reports that symptoms are worse with eating.  Pt reports that he was advised to lose weight and had motiligy studies and gerd.  Pt reports that he was advised to see weight loss surgery.  Pt reports that every day he feels like bloated.  Pt reports that he feels something in the left upper and throught back.  Pt reports that he feels something still. Pt reports that he has not tried motility.   Of note review of records from 2022 with  normal gastric emptying Still w distension and bloating creon  8/29/23 Follow up: Normal previous GES. Missed appointment for repeat GES. Negative SIBO. Incomplete lactulose testing. Patient trialed Ibsrela with improvement in symptoms. Denied coverage by insurance. Specialty pharmacy plans to reach out to patient about PAP. Recently diagnosed with hypertriglyceridemia and started on fenofibrate. Denies EtOH and tobacco use. On Creon. Still with bloating, pain, and constipation. 9-25-23 Pt reports that he was tried on ibresla. Insurance did not help. has ongoing gerd.  Pt reports that he has mucous in stools.  Pt reports that he saw some "material" in his stool and has x ray with chiropracter which demonstrated abnormal bowel gas pattern with thumbprinting. Pt from  and concerned about "parasitic" infection. Noted 2  episodes of rectal bleeding.  Thinks possibly hemorrhoids.   3.18.24 EGD 2023: - Ulceration was found in the right pyriform sinus. - Abnormal esophageal motility. - A single gastric polyp. Resected and retrieved. - The examination was otherwise normal. - Biopsies were taken with a cold forceps for evaluation of eosinophilic esophagitis. - Biopsies were taken with a cold forceps for Helicobacter pylori testing. - Biopsies were taken with a cold forceps for evaluation of celiac disease.  path neg for HP, only with mild reflux  here today, needs refills for PPI which he states helps his pain, but alsocomplains of abd pain with constipation and does not have anything to trial.  BMs 2x per week.  States his overall abd pain is better since he is on medicine for anxiety  5/24/24: Pt returns today complaing of 2 weeks of upper to periumbilical burning abd pain after eating with excessive bloating and pressure. Appetite is fine, no early or prolonged satiety. Has some nausea but no vomiting. Felt Dexilant was too strong for his stomach, switched to OTC omeprazole BID which helps both heartburn/reflux and dyspepsia, wants Rx. Not taking amitriptyline, cannot remember if it helped. PCP recently sent in Xanax, has not picked it up. Still leaning towards consitpation, 1-2 BMs/day with straining. Using Ibsrela samples prn.  7-22-24 In October treated for stool studies revealing blastocystis homonii Pt reports that h feels like he has discomfort in the left sternal region, worse with breathing . Noticed a rash under both breasts. Occasional constipation, takes Ibsrela.   Pt reports that he is stil taking the omeprazole otc.  Pt reports that he had better response to the dexilant than omeprzole which he is taking twice daily. Failed protonix. Has chronic back pain as well. No cbh. No rectal bleeding.

## 2024-07-24 LAB
A/G RATIO: 1.8
ABSOLUTE BASOPHILS: 50
ABSOLUTE EOSINOPHILS: 99
ABSOLUTE LYMPHOCYTES: 2306
ABSOLUTE MONOCYTES: 601
ABSOLUTE NEUTROPHILS: 3143
ALBUMIN: 4.7
ALKALINE PHOSPHATASE: 55
ALT (SGPT): 28
AST (SGOT): 39
BASOPHILS: 0.8
BILIRUBIN, TOTAL: 0.4
BUN/CREATININE RATIO: (no result)
BUN: 16
C-REACTIVE PROTEIN, QUANT: 8
CALCIUM: 9.6
CARBON DIOXIDE, TOTAL: 25
CHLORIDE: 102
CREATININE: 0.87
EGFR: 114
EOSINOPHILS: 1.6
GLOBULIN, TOTAL: 2.6
GLUCOSE: 85
HEMATOCRIT: 47.1
HEMOGLOBIN: 15.8
LIPASE: 46
LYMPHOCYTES: 37.2
MCH: 28.2
MCHC: 33.5
MCV: 84.1
MONOCYTES: 9.7
MPV: 9.1
NEUTROPHILS: 50.7
PLATELET COUNT: 264
POTASSIUM: 4
PROTEIN, TOTAL: 7.3
RDW: 14
RED BLOOD CELL COUNT: 5.6
SODIUM: 138
WHITE BLOOD CELL COUNT: 6.2

## 2024-07-25 LAB — OVA AND PARASITES, CONC AND PERM SMEAR: (no result)

## 2024-08-02 ENCOUNTER — TELEPHONE ENCOUNTER (OUTPATIENT)
Dept: URBAN - NONMETROPOLITAN AREA CLINIC 4 | Facility: CLINIC | Age: 39
End: 2024-08-02

## 2024-08-05 ENCOUNTER — LAB OUTSIDE AN ENCOUNTER (OUTPATIENT)
Dept: URBAN - METROPOLITAN AREA CLINIC 54 | Facility: CLINIC | Age: 39
End: 2024-08-05

## 2024-08-08 LAB — OVA AND PARASITES, CONC AND PERM SMEAR: (no result)

## 2024-08-19 ENCOUNTER — OFFICE VISIT (OUTPATIENT)
Dept: URBAN - NONMETROPOLITAN AREA CLINIC 4 | Facility: CLINIC | Age: 39
End: 2024-08-19
Payer: COMMERCIAL

## 2024-08-19 VITALS
SYSTOLIC BLOOD PRESSURE: 156 MMHG | DIASTOLIC BLOOD PRESSURE: 89 MMHG | HEIGHT: 68 IN | BODY MASS INDEX: 36.77 KG/M2 | WEIGHT: 242.6 LBS | TEMPERATURE: 98.1 F | HEART RATE: 72 BPM

## 2024-08-19 DIAGNOSIS — B36.9 FUNGAL RASH OF TORSO: ICD-10-CM

## 2024-08-19 DIAGNOSIS — K59.09 CHRONIC CONSTIPATION: ICD-10-CM

## 2024-08-19 DIAGNOSIS — R07.81 PLEURITIC PAIN: ICD-10-CM

## 2024-08-19 DIAGNOSIS — K21.9 GERD WITHOUT ESOPHAGITIS: ICD-10-CM

## 2024-08-19 PROCEDURE — 99214 OFFICE O/P EST MOD 30 MIN: CPT | Performed by: PHYSICIAN ASSISTANT

## 2024-08-19 RX ORDER — OMEPRAZOLE 20 MG/1
1 CAPSULE 30 MINUTES BEFORE MEAL CAPSULE, DELAYED RELEASE ORAL TWICE A DAY
Qty: 60 | Refills: 5 | Status: ACTIVE | COMMUNITY

## 2024-08-19 RX ORDER — ALPRAZOLAM 0.5 MG/1
1 TABLET TABLET ORAL AT BEDTIME
Status: ACTIVE | COMMUNITY

## 2024-08-19 RX ORDER — LOSARTAN POTASSIUM 50 MG/1
1 TABLET TABLET ORAL ONCE A DAY
Status: ACTIVE | COMMUNITY

## 2024-08-19 RX ORDER — DICYCLOMINE HYDROCHLORIDE 10 MG/1
2 CAPSULES CAPSULE ORAL THREE TIMES A DAY
Qty: 180 | Status: ACTIVE | COMMUNITY
Start: 2024-07-22 | End: 2024-08-21

## 2024-08-19 RX ORDER — TENAPANOR HYDROCHLORIDE 53.2 MG/1
1 TABLET IMMEDIATELY BEFORE MEALS TABLET ORAL TWICE A DAY
Status: ACTIVE | COMMUNITY

## 2024-08-19 RX ORDER — NYSTATIN 100000 [USP'U]/G
1 APPLICATION POWDER TOPICAL TWICE A DAY
Qty: 1 KIT | Refills: 0 | Status: ON HOLD | COMMUNITY
Start: 2024-07-22 | End: 2024-08-21

## 2024-08-19 NOTE — HPI-TODAY'S VISIT:
Patient reports that he has has stomach pain and bloating x 1 year.  Had upper endoscopy which thought to have an allergy. Had a lot of inflammation. Placed on gluten free diet and placed on dexilant. Pt was referred for biliary dyskinesia and then he felt slightly better the first week. Had lost 10-15#.  Pt reports that he regained the weight.  Pt reports that the symptoms did not stop.  Pt reports that the dexilant was not working any further. Pt was found to have delayed gastric emptying as well.  Pt reports that he was advised to continue with the medications.  Pt reports that he was recommended to take the omeprazole and wean off the dexilant.  Pt reports that the bloating bothers him the most. The reflux is persistent that radiates to the back.  Pt reports that he has pain to neck and has a panic attack.  6-5-23 Pt reports that he had an ER visit in March along with CT which was negative.  Pt reports that symptoms are worse with eating.  Pt reports that he was advised to lose weight and had motiligy studies and gerd.  Pt reports that he was advised to see weight loss surgery.  Pt reports that every day he feels like bloated.  Pt reports that he feels something in the left upper and throught back.  Pt reports that he feels something still. Pt reports that he has not tried motility.   Of note review of records from 2022 with  normal gastric emptying Still w distension and bloating creon  8/29/23 Follow up: Normal previous GES. Missed appointment for repeat GES. Negative SIBO. Incomplete lactulose testing. Patient trialed Ibsrela with improvement in symptoms. Denied coverage by insurance. Specialty pharmacy plans to reach out to patient about PAP. Recently diagnosed with hypertriglyceridemia and started on fenofibrate. Denies EtOH and tobacco use. On Creon. Still with bloating, pain, and constipation. 9-25-23 Pt reports that he was tried on ibresla. Insurance did not help. has ongoing gerd.  Pt reports that he has mucous in stools.  Pt reports that he saw some "material" in his stool and has x ray with chiropracter which demonstrated abnormal bowel gas pattern with thumbprinting. Pt from  and concerned about "parasitic" infection. Noted 2  episodes of rectal bleeding.  Thinks possibly hemorrhoids.   3.18.24 EGD 2023: - Ulceration was found in the right pyriform sinus. - Abnormal esophageal motility. - A single gastric polyp. Resected and retrieved. - The examination was otherwise normal. - Biopsies were taken with a cold forceps for evaluation of eosinophilic esophagitis. - Biopsies were taken with a cold forceps for Helicobacter pylori testing. - Biopsies were taken with a cold forceps for evaluation of celiac disease.  path neg for HP, only with mild reflux  here today, needs refills for PPI which he states helps his pain, but alsocomplains of abd pain with constipation and does not have anything to trial.  BMs 2x per week.  States his overall abd pain is better since he is on medicine for anxiety  5/24/24: Pt returns today complaing of 2 weeks of upper to periumbilical burning abd pain after eating with excessive bloating and pressure. Appetite is fine, no early or prolonged satiety. Has some nausea but no vomiting. Felt Dexilant was too strong for his stomach, switched to OTC omeprazole BID which helps both heartburn/reflux and dyspepsia, wants Rx. Not taking amitriptyline, cannot remember if it helped. PCP recently sent in Xanax, has not picked it up. Still leaning towards consitpation, 1-2 BMs/day with straining. Using Ibsrela samples prn.  7-22-24 In October treated for stool studies revealing blastocystis homonii Pt reports that h feels like he has discomfort in the left sternal region, worse with breathing . Noticed a rash under both breasts. Occasional constipation, takes Ibsrela.   Pt reports that he is stil taking the omeprazole otc.  Pt reports that he had better response to the dexilant than omeprzole which he is taking twice daily. Failed protonix. Has chronic back pain as well. No cbh. No rectal bleeding.  8.19.24 here today for follow up of imaging and stool tests.  His CT abd pel is negative for acute abnormalities his last EGD from 2023 showed esophageal motility changes, but otherwise no abnormalities.  Repeat stool studies x 2 for O/P negative.  He continues to be intermittently compliant with treatent recommendations.  Takes meds for constipation PRN despite samples given  continues to show me on his abdomen this spasm motion of his stomach.

## 2024-10-15 ENCOUNTER — OFFICE VISIT (OUTPATIENT)
Dept: URBAN - METROPOLITAN AREA SURGERY CENTER 14 | Facility: SURGERY CENTER | Age: 39
End: 2024-10-15

## 2024-11-05 ENCOUNTER — OFFICE VISIT (OUTPATIENT)
Dept: URBAN - NONMETROPOLITAN AREA CLINIC 4 | Facility: CLINIC | Age: 39
End: 2024-11-05

## 2024-12-03 ENCOUNTER — OFFICE VISIT (OUTPATIENT)
Dept: URBAN - METROPOLITAN AREA SURGERY CENTER 14 | Facility: SURGERY CENTER | Age: 39
End: 2024-12-03

## 2024-12-30 ENCOUNTER — OFFICE VISIT (OUTPATIENT)
Dept: URBAN - NONMETROPOLITAN AREA CLINIC 4 | Facility: CLINIC | Age: 39
End: 2024-12-30

## 2024-12-30 VITALS
BODY MASS INDEX: 36.37 KG/M2 | TEMPERATURE: 98 F | WEIGHT: 240 LBS | DIASTOLIC BLOOD PRESSURE: 90 MMHG | HEIGHT: 68 IN | HEART RATE: 86 BPM | SYSTOLIC BLOOD PRESSURE: 142 MMHG

## 2024-12-30 RX ORDER — OMEPRAZOLE 20 MG/1
1 CAPSULE 30 MINUTES BEFORE MEAL CAPSULE, DELAYED RELEASE ORAL TWICE A DAY
Qty: 60 | Refills: 5 | Status: ACTIVE | COMMUNITY

## 2024-12-30 RX ORDER — OMEPRAZOLE 20 MG/1
1 CAPSULE 30 MINUTES BEFORE MEAL CAPSULE, DELAYED RELEASE ORAL TWICE A DAY
Qty: 60 | Refills: 5

## 2024-12-30 RX ORDER — ALPRAZOLAM 0.5 MG/1
1 TABLET TABLET ORAL AT BEDTIME
Status: ACTIVE | COMMUNITY

## 2024-12-30 RX ORDER — TENAPANOR HYDROCHLORIDE 53.2 MG/1
1 TABLET IMMEDIATELY BEFORE MEALS TABLET ORAL TWICE A DAY
Status: ACTIVE | COMMUNITY

## 2024-12-30 RX ORDER — LOSARTAN POTASSIUM 50 MG/1
1 TABLET TABLET ORAL ONCE A DAY
Status: ACTIVE | COMMUNITY

## 2024-12-30 NOTE — HPI-TODAY'S VISIT:
Pt reports that he has had burning in the stomach.  Pt reprots that he has burning int he stomach sometimes.  Pt reports that he has sometimes unable to sleep .  Pt reports that his daughter has a virus.  Pt reports that the acid reducing medications typiclly help his stomach.  Pt reports that the only thing to do is to

## 2025-02-13 ENCOUNTER — TELEPHONE ENCOUNTER (OUTPATIENT)
Dept: URBAN - METROPOLITAN AREA CLINIC 54 | Facility: CLINIC | Age: 40
End: 2025-02-13

## 2025-02-17 ENCOUNTER — OFFICE VISIT (OUTPATIENT)
Dept: URBAN - METROPOLITAN AREA SURGERY CENTER 14 | Facility: SURGERY CENTER | Age: 40
End: 2025-02-17
Payer: COMMERCIAL

## 2025-02-17 ENCOUNTER — CLAIMS CREATED FROM THE CLAIM WINDOW (OUTPATIENT)
Dept: URBAN - METROPOLITAN AREA CLINIC 4 | Facility: CLINIC | Age: 40
End: 2025-02-17
Payer: COMMERCIAL

## 2025-02-17 DIAGNOSIS — K63.89 BACTERIAL OVERGROWTH SYNDROME: ICD-10-CM

## 2025-02-17 DIAGNOSIS — D12.2 BENIGN NEOPLASM OF ASCENDING COLON: ICD-10-CM

## 2025-02-17 DIAGNOSIS — K63.89 OTHER SPECIFIED DISEASES OF INTESTINE: ICD-10-CM

## 2025-02-17 DIAGNOSIS — K59.09 ACUTE CONSTIPATION IN CHILDHOOD: ICD-10-CM

## 2025-02-17 DIAGNOSIS — D12.2 ADENOMA OF ASCENDING COLON: ICD-10-CM

## 2025-02-17 DIAGNOSIS — R10.84 ABDOMINAL CRAMPING, GENERALIZED: ICD-10-CM

## 2025-02-17 DIAGNOSIS — K57.30 COLONIC DIVERTICULOSIS: ICD-10-CM

## 2025-02-17 PROCEDURE — 88342 IMHCHEM/IMCYTCHM 1ST ANTB: CPT | Performed by: PATHOLOGY

## 2025-02-17 PROCEDURE — 45380 COLONOSCOPY AND BIOPSY: CPT | Performed by: INTERNAL MEDICINE

## 2025-02-17 PROCEDURE — 45385 COLONOSCOPY W/LESION REMOVAL: CPT | Performed by: INTERNAL MEDICINE

## 2025-02-17 PROCEDURE — 88313 SPECIAL STAINS GROUP 2: CPT | Performed by: PATHOLOGY

## 2025-02-17 PROCEDURE — 88305 TISSUE EXAM BY PATHOLOGIST: CPT | Performed by: PATHOLOGY

## 2025-02-17 PROCEDURE — 00811 ANES LWR INTST NDSC NOS: CPT | Performed by: NURSE ANESTHETIST, CERTIFIED REGISTERED

## 2025-02-27 ENCOUNTER — TELEPHONE ENCOUNTER (OUTPATIENT)
Dept: URBAN - NONMETROPOLITAN AREA CLINIC 4 | Facility: CLINIC | Age: 40
End: 2025-02-27

## 2025-04-22 ENCOUNTER — OFFICE VISIT (OUTPATIENT)
Dept: URBAN - NONMETROPOLITAN AREA CLINIC 4 | Facility: CLINIC | Age: 40
End: 2025-04-22

## 2025-04-22 ENCOUNTER — OFFICE VISIT (OUTPATIENT)
Dept: URBAN - METROPOLITAN AREA CLINIC 54 | Facility: CLINIC | Age: 40
End: 2025-04-22
Payer: COMMERCIAL

## 2025-04-22 DIAGNOSIS — K21.9 GERD WITHOUT ESOPHAGITIS: ICD-10-CM

## 2025-04-22 DIAGNOSIS — K57.90 DIVERTICULOSIS: ICD-10-CM

## 2025-04-22 DIAGNOSIS — Z86.0100 HISTORY OF COLON POLYPS: ICD-10-CM

## 2025-04-22 DIAGNOSIS — K58.1 IRRITABLE BOWEL SYNDROME WITH CONSTIPATION: ICD-10-CM

## 2025-04-22 DIAGNOSIS — K64.8 INTERNAL HEMORRHOIDS: ICD-10-CM

## 2025-04-22 PROBLEM — 440630006: Status: ACTIVE | Noted: 2025-04-22

## 2025-04-22 PROBLEM — 397881000: Status: ACTIVE | Noted: 2025-04-22

## 2025-04-22 PROCEDURE — 99214 OFFICE O/P EST MOD 30 MIN: CPT

## 2025-04-22 RX ORDER — OMEPRAZOLE 20 MG/1
1 CAPSULE 30 MINUTES BEFORE MEAL CAPSULE, DELAYED RELEASE ORAL TWICE A DAY
Qty: 60 | Refills: 5 | Status: ON HOLD | COMMUNITY

## 2025-04-22 RX ORDER — TENAPANOR HYDROCHLORIDE 53.2 MG/1
1 TABLET IMMEDIATELY BEFORE MEALS TABLET ORAL TWICE A DAY
Status: ACTIVE | COMMUNITY

## 2025-04-22 RX ORDER — TENAPANOR HYDROCHLORIDE 53.2 MG/1
1 TABLET IMMEDIATELY BEFORE MEALS TABLET ORAL TWICE A DAY
Status: ON HOLD | COMMUNITY

## 2025-04-22 RX ORDER — DEXLANSOPRAZOLE 30 MG/1
1 CAPSULE CAPSULE, DELAYED RELEASE ORAL ONCE A DAY
Qty: 30 | Status: ACTIVE | COMMUNITY
Start: 2024-07-22

## 2025-04-22 RX ORDER — OMEPRAZOLE 20 MG/1
1 CAPSULE 30 MINUTES BEFORE MEAL CAPSULE, DELAYED RELEASE ORAL TWICE A DAY
Qty: 60 | Refills: 5 | Status: ACTIVE | COMMUNITY

## 2025-04-22 RX ORDER — LOSARTAN POTASSIUM 50 MG/1
1 TABLET TABLET ORAL ONCE A DAY
Status: ACTIVE | COMMUNITY

## 2025-04-22 RX ORDER — ALPRAZOLAM 0.5 MG/1
1 TABLET TABLET ORAL AT BEDTIME
Status: ACTIVE | COMMUNITY

## 2025-04-22 RX ORDER — LINACLOTIDE 290 UG/1
1 CAPSULE AT LEAST 30 MINUTES BEFORE THE FIRST MEAL OF THE DAY ON AN EMPTY STOMACH CAPSULE, GELATIN COATED ORAL ONCE A DAY
Qty: 90 | Refills: 3 | OUTPATIENT
Start: 2025-04-22 | End: 2026-04-17

## 2025-04-22 RX ORDER — BUSPIRONE HYDROCHLORIDE 5 MG/1
1 TABLET TABLET ORAL TWICE A DAY
Qty: 60 TABLET | Refills: 1 | OUTPATIENT
Start: 2025-04-22 | End: 2025-06-21

## 2025-04-22 NOTE — HPI-TODAY'S VISIT:
Pt reports that he has had burning in the stomach.  Pt reprots that he has burning int he stomach sometimes.  Pt reports that he has sometimes unable to sleep .  Pt reports that his daughter has a virus.  Pt reports that the acid reducing medications typiclly help his stomach.    1.	Pleuritic pain - R07.81 (Primary)    Notes : resolved   2.	Fungal rash of torso - B36.9    Notes : resolved   3.	GERD without esophagitis - K21.9    Notes : symptomatic now with globus return to PPI therapy return in 1 month to assess tolerance and improvement8.19.24taking Omeprazole OTC PRN   4.	Chronic constipation - K59.09    Notes : I suspect this is the reason for his abd pain currently BMs only 2x per week trial IBSrela, and if improvement will send script if pain persistent, consider imaging and cscope8.19.24At this point I discussed with him how all of his previous work up and current up-to-date workup are all negative for any gastroenterology abnormalities. Has had follow-up stool testing times two with no evidence of over or parasite. his recent ct scan of the abdomen and pelvis at the hospital was also negative for any acute pathology. Not had a colonoscopy with us and given his bloating and constipation I think this is reasonable to proceed with to confirm no underlying pathologyRisks, benefits, and alternatives discussed. Patient agrees to pursue colonoscopy.    5.	Parasite infection - [id number] visits Placed on gluten free diet and placed on dexilant. Pt was referred for biliary dyskinesia and then he felt slightly better the first week. Had lost 10-15#.  Pt reports that he regained the weight.  Pt reports that the symptoms did not stop.  Pt reports that the dexilant was not working any further. Pt was found to have delayed gastric emptying as well.  Pt reports that he was advised to continue with the medications.  Pt reports that he was recommended to take the omeprazole and wean off the dexilant.  Pt reports that the bloating bothers him the most. The reflux is persistent that radiates to the back.  Pt reports that he has pain to neck and has a panic attack.  6-5-23 Pt reports that he had an ER visit in March along with CT which was negative.  Pt reports that symptoms are worse with eating.  Pt reports that he was advised to lose weight and had motiligy studies and gerd.  Pt reports that he was advised to see weight loss surgery.  Pt reports that every day he feels like bloated.  Pt reports that he feels something in the left upper and throught back.  Pt reports that he feels something still. Pt reports that he has not tried motility.   Of note review of records from 2022 with  normal gastric emptying Still w distension and bloating creon  8/29/23 Follow up: Normal previous GES. Missed appointment for repeat GES. Negative SIBO. Incomplete lactulose testing. Patient trialed Ibsrela with improvement in symptoms. Denied coverage by insurance. Specialty pharmacy plans to reach out to patient about PAP. Recently diagnosed with hypertriglyceridemia and started on fenofibrate. Denies EtOH and tobacco use. On Creon. Still with bloating, pain, and constipation. 9-25-23 Pt reports that he was tried on ibresla. Insurance did not help. has ongoing gerd.  Pt reports that he has mucous in stools.  Pt reports that he saw some "material" in his stool and has x ray with chiropracter which demonstrated abnormal bowel gas pattern with thumbprinting. Pt from  and concerned about "parasitic" infection. Noted 2  episodes of rectal bleeding.  Thinks possibly hemorrhoids.   3.18.24 EGD 2023: - Ulceration was found in the right pyriform sinus. - Abnormal esophageal motility. - A single gastric polyp. Resected and retrieved. - The examination was otherwise normal. - Biopsies were taken with a cold forceps for evaluation of eosinophilic esophagitis. - Biopsies were taken with a cold forceps for Helicobacter pylori testing. - Biopsies were taken with a cold forceps for evaluation of celiac disease.  path neg for HP, only with mild reflux  here today, needs refills for PPI which he states helps his pain, but alsocomplains of abd pain with constipation and does not have anything to trial.  BMs 2x per week.  States his overall abd pain is better since he is on medicine for anxiety  5/24/24: Pt returns today complaing of 2 weeks of upper to periumbilical burning abd pain after eating with excessive bloating and pressure. Appetite is fine, no early or prolonged satiety. Has some nausea but no vomiting. Felt Dexilant was too strong for his stomach, switched to OTC omeprazole BID which helps both heartburn/reflux and dyspepsia, wants Rx. Not taking amitriptyline, cannot remember if it helped. PCP recently sent in Xanax, has not picked it up. Still leaning towards consitpation, 1-2 BMs/day with straining. Using Ibsrela samples prn.  7-22-24 In October treated for stool studies revealing blastocystis homonii Pt reports that h feels like he has discomfort in the left sternal region, worse with breathing . Noticed a rash under both breasts. Occasional constipation, takes Ibsrela.   Pt reports that he is stil taking the omeprazole otc.  Pt reports that he had better response to the dexilant than omeprzole which he is taking twice daily. Failed protonix. Has chronic back pain as well. No cbh. No rectal bleeding.  8.19.24 here today for follow up of imaging and stool tests.  His CT abd pel is negative for acute abnormalities his last EGD from 2023 showed esophageal motility changes, but otherwise no abnormalities.  Repeat stool studies x 2 for O/P negative.  He continues to be intermittently compliant with treatent recommendations.  Takes meds for constipation PRN despite samples given  continues to show me on his abdomen this spasm motion of his stomach.  4/22/25 Follow Up: Patient returns for colonoscopy follow up, detailed below. Reviewed findings with pt and partner, all questions answered. Two small TAs removed from AC, repeat in 5 years. Diverticulosis and internal hemorrhoids noted. Again could not get Ibsrela covered for constipation. Interested in trying Linzess. Continues to have early and prolonged satiety with bloating. Previously prescribed buspirone by PCP but has not been consistent with Rx. Voquezna worsened constipation.  Colonoscopy 2/17/25: - The examined portion of the ileum was normal. Biopsied. - Diverticulosis in the sigmoid colon. - Two 3 to 4 mm polyps in the ascending colon, removed with a cold snare. Resected and retrieved. - Internal hemorrhoids. - Biopsies were taken with a cold forceps from the entire colon for evaluation of microscopic colitis. Biopsy: Tubular adenomas. Negative random colon and ileal biopsies

## 2025-04-23 ENCOUNTER — P2P PATIENT RECORD (OUTPATIENT)
Age: 40
End: 2025-04-23

## 2025-04-23 ENCOUNTER — TELEPHONE ENCOUNTER (OUTPATIENT)
Dept: URBAN - METROPOLITAN AREA CLINIC 54 | Facility: CLINIC | Age: 40
End: 2025-04-23

## 2025-04-24 ENCOUNTER — TELEPHONE ENCOUNTER (OUTPATIENT)
Dept: URBAN - METROPOLITAN AREA CLINIC 54 | Facility: CLINIC | Age: 40
End: 2025-04-24

## 2025-05-08 ENCOUNTER — OFFICE VISIT (OUTPATIENT)
Dept: URBAN - NONMETROPOLITAN AREA CLINIC 4 | Facility: CLINIC | Age: 40
End: 2025-05-08

## 2025-06-03 ENCOUNTER — OFFICE VISIT (OUTPATIENT)
Dept: URBAN - METROPOLITAN AREA CLINIC 54 | Facility: CLINIC | Age: 40
End: 2025-06-03